# Patient Record
Sex: MALE | Race: WHITE | NOT HISPANIC OR LATINO | Employment: FULL TIME | URBAN - METROPOLITAN AREA
[De-identification: names, ages, dates, MRNs, and addresses within clinical notes are randomized per-mention and may not be internally consistent; named-entity substitution may affect disease eponyms.]

---

## 2017-09-27 ENCOUNTER — HOSPITAL ENCOUNTER (EMERGENCY)
Facility: MEDICAL CENTER | Age: 27
End: 2017-09-27
Attending: EMERGENCY MEDICINE
Payer: COMMERCIAL

## 2017-09-27 ENCOUNTER — HOSPITAL ENCOUNTER (OUTPATIENT)
Dept: RADIOLOGY | Facility: MEDICAL CENTER | Age: 27
End: 2017-09-27

## 2017-09-27 VITALS
WEIGHT: 140 LBS | OXYGEN SATURATION: 98 % | HEART RATE: 69 BPM | RESPIRATION RATE: 16 BRPM | DIASTOLIC BLOOD PRESSURE: 61 MMHG | HEIGHT: 70 IN | SYSTOLIC BLOOD PRESSURE: 130 MMHG | BODY MASS INDEX: 20.04 KG/M2 | TEMPERATURE: 99.9 F

## 2017-09-27 DIAGNOSIS — S68.623A PARTIAL TRAUMATIC TRANSPHALANGEAL AMPUTATION OF LEFT MIDDLE FINGER, INITIAL ENCOUNTER: ICD-10-CM

## 2017-09-27 DIAGNOSIS — S68.119A: ICD-10-CM

## 2017-09-27 DIAGNOSIS — S68.627A: ICD-10-CM

## 2017-09-27 PROCEDURE — 99285 EMERGENCY DEPT VISIT HI MDM: CPT

## 2017-09-27 PROCEDURE — 96374 THER/PROPH/DIAG INJ IV PUSH: CPT

## 2017-09-27 PROCEDURE — 700111 HCHG RX REV CODE 636 W/ 250 OVERRIDE (IP): Performed by: EMERGENCY MEDICINE

## 2017-09-27 RX ADMIN — HYDROMORPHONE HYDROCHLORIDE 1 MG: 1 INJECTION, SOLUTION INTRAMUSCULAR; INTRAVENOUS; SUBCUTANEOUS at 14:34

## 2017-09-27 NOTE — LETTER
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    Medical Information   Related to Industrial Injury? Yes ***   Subjective Complaints:  Hand injury, finger amputation   Objective Findings: Hand injury, finger amputation   Pre-Existing Condition(s):     Assessment:   Condition Same    Status: Additional Care RequiredDischarged / Care Transfer  Permanent Disability:Yes    Plan: ConsultationTransfer Care    Diagnostics: X-ray    Comments:  Transfer to Center that does finger reimplantations    Disability Information   Status: Temporarily Totally Disabled    From:     Through:   Restrictions are:     Physical Restrictions   Sitting:    Standing:    Stooping:    Bending:      Squatting:    Walking:    Climbing:    Pushing:      Pulling:    Other:    Reaching Above Shoulder (L):   Reaching Above Shoulder (R):       Reaching Below Shoulder (L):    Reaching Below Shoulder (R):      Not to exceed Weight Limits   Carrying(hrs):   Weight Limit(lb):   Lifting(hrs):   Weight  Limit(lb):     Comments: Patient was transferred to [place]    Repetitive Actions   Hands: i.e. Fine Manipulations from Grasping:     Feet: i.e. Operating Foot Controls:     Driving / Operate Machinery:     Physician Name: Neo Oliveira Physician  Signature: [email or web address]-[email or web address] Wilder M.D. e-Signature:  , Medical Director   Clinic Name / Location: [facility], EMERGENCY DEPT  [address] [place] [id number]  [phone]     Clinic Phone Number: Dept: [phone]   Appointment Time:  Visit Start Time:    Check-In Time:  2:08 PM Visit Discharge Time: 5:43 PM   Original-Treating Physician or Chiropractor    Page 2-Insurer/TPA    Page 3-Employer    Page 4-Employee

## 2017-09-27 NOTE — LETTER
[facility], EMERGENCY DEPT   [address] [place], [place] [address]  Phone: Dept: [phone] - Fax:        [facility] Progress Report and Disability Certification  Date of Service: 9/27/2017   No Show:  No  Date / Time of Next Visit:     Claim Information   Patient Name: Eriberto Mckeon  Claim Number:     Employer:    Date of Injury:      Insurer / TPA: [employer] ID / SSN: [id number]    Occupation:  Diagnosis: There were no encounter diagnoses.    Medical Information   Related to Industrial Injury?   ***   Subjective Complaints:      Objective Findings:     Pre-Existing Condition(s):     Assessment:        Status:    Permanent Disability:     Plan:      Diagnostics:      Comments:       Disability Information   Status:      From:     Through:   Restrictions are:     Physical Restrictions   Sitting:    Standing:    Stooping:    Bending:      Squatting:    Walking:    Climbing:    Pushing:      Pulling:    Other:    Reaching Above Shoulder (L):   Reaching Above Shoulder (R):       Reaching Below Shoulder (L):    Reaching Below Shoulder (R):      Not to exceed Weight Limits   Carrying(hrs):   Weight Limit(lb):   Lifting(hrs):   Weight  Limit(lb):     Comments:      Repetitive Actions   Hands: i.e. Fine Manipulations from Grasping:     Feet: i.e. Operating Foot Controls:     Driving / Operate Machinery:     Physician Name: Neo Oliveira Physician Signature:   e-Signature:  , Medical Director   Clinic Name / Location: [facility], EMERGENCY DEPT  [address] [place] [id number] [phone]     Clinic Phone Number: Dept: [phone]   Appointment Time:  Visit Start Time:    Check-In Time:  2:08 PM Visit Discharge Time:    Original-Treating Physician or Chiropractor    Page 2-Insurer/TPA    Page 3-Employer    Page 4-Employee

## 2017-09-27 NOTE — ED NOTES
Amputated finger removed from specimen cup - wrapped in moist gauze and placed in bag. Bag placed in ice.

## 2017-09-27 NOTE — ED PROVIDER NOTES
"ED Provider Note    Scribed for Neo Oliveira M.D. by Sil Harris. 9/27/2017  2:17 PM    Primary care provider: Pcp Pt States None  Means of arrival: EMS  History obtained from: Patient   History limited by: None    CHIEF COMPLAINT  Chief Complaint   Patient presents with   • Amputation   • Hand Injury       HPI  Eriberto Mckeon is a 27 y.o. male who presents to the Emergency Department by EMS from [facility] for amputation of his left index finger at 1150AM this morning. Patient was at work and using a table saw when he cut off his left finger. Patient states the pain to his left hand is beginning to \"build up again.\" He denies any nausea, vomiting, chest pain, abdominal pain or fever associated. He was an occasional smoker. The patient denies any past pertinent medical history, use of daily medications or allergies to medications.    REVIEW OF SYSTEMS  Pertinent positives include left index finger amputation, pain to left hand. Pertinent negatives include no nausea, vomiting, chest pain, shortness of breath, abdominal pain or fever.  All other systems reviewed and negative.  C.    PAST MEDICAL HISTORY    denies any past pertinent medical history    SURGICAL HISTORY   has a past surgical history that includes appendectomy.    SOCIAL HISTORY  Social History   Substance Use Topics   • Smoking status: Never Smoker   • Smokeless tobacco: Never Used   • Alcohol use Yes      Comment: 2-3 per day      History   Drug Use   • Types: Inhaled     Comment: THC       FAMILY HISTORY  History reviewed. No pertinent family history.    CURRENT MEDICATIONS  Home Medications     Reviewed by Theresa Shi R.N. (Registered Nurse) on 09/27/17 at 1416  Med List Status: Complete   Medication Last Dose Status        Patient Rocco Taking any Medications                       ALLERGIES  Allergies   Allergen Reactions   • Sulfa Drugs Hives       PHYSICAL EXAM  VITAL SIGNS: /92   Pulse 78   Temp 37 °C (98.6 " "°F)   Resp 20   Ht 1.778 m (5' 10\")   Wt 63.5 kg (140 lb)   BMI 20.09 kg/m²     Vital signs reviewed.  Constitutional: Well-appearing. No acute distress. Pleasant. Conversant  Head: Normocephalic, atraumatic  Eyes: PERRL, EOMI,   Mouth/Throat: Oropharynx is moist   Neck: Supple, no JVD  Cardiovascular: Regular rate and rhythm   Pulmonary/Chest: Lungs are clear to auscultation.   Abdominal: Soft, non-tender. normal bowel sounds  Musculoskeletal: Trans-phalangeal amputation the left 2nd digit. Near amputation of the 3rd and 4th left digits.  Lymphadenopathy: No lymphadenopathy at the elbow  Neurological: some sensation is intact of the 3rd and 4th digits  Skin: Warm, dry. No rash  Psychiatric: Appropriate for clinical presentation.    RADIOLOGY  OUTSIDE IMAGES-DX UPPER EXTREMITY, LEFT   Final Result      X-ray showed a transmetatarsal amputation of the left 2nd digit. There was extensive damage and open fractures to the left 3rd and 4th digits  .    COURSE & MEDICAL DECISION MAKING  Pertinent Labs & Imaging studies reviewed. (See chart for details) The patient's [facility] Nursing and past medical  records were reviewed    2:17 PM - Patient seen and examined at bedside. Patient will be treated with 1mg dilaudid. The differential diagnoses include but are not limited to:Amputation and near amputations    2:28 PM - I discussed the patient's case and the above findings with Dr. Riley ([facility]) who referred patient to [facility] for evaluation by the hand specialist on-call.  When he called the ER initially Dr. Sneed told him that we do not do reimplantations here. He was told expressly that if the patient desired to have a reimplantation done this with be done at the [facility] in [place]. They did not call this specialty clinic from [place]    2:46 PM Recheck: Patient re-evaluated at beside. Patient's radiology from outside clinic  Discussed. Patient wishes to have the [facility] contacted and determine if he would be a candidate for re-implantation.     2:49 PM Paged [facility] Hand specialist.     2:58 PM - I discussed the patient's case and the above findings with Dr. Nguyễn (Orthopedics) who agrees to manage the patient's wound but it would include an amputation of the 2nd digit. The patient does not want this at this point.     3:17 PM Recheck: Patient re-evaluated at [facility].  Discussed patient's condition and treatment plan.  The patient understood and is in agreement.     3:37 PM - I discussed the patient's case and the above findings with Dr. Benson ([facility] Hand specialist) who requests I send him DX-upper extremity left images.     3:39 PM Recheck: Patient re-evaluated at [facility]. Discussed my conversation with Dr. Benson with the patient. He understood and is in agreement. The patient would like to be transferred to [place]    3:48 PM - I discussed the patient's case and the above findings with Dr. Benson ([facility] Hand specialist) who agrees to accept patient for further treatment.    CRITICAL CARE  The very real possibility of a deterioration of this patient's condition required the highest level of my preparedness for sudden, emergent intervention.  I provided critical care services, which included medication orders, frequent reevaluations of the patient's condition and response to treatment, ordering and reviewing test results, and discussing the case with various consultants.  The critical care time associated with the care of the patient was 45 minutes. Review chart for interventions. This time is exclusive of any other billable procedures.     Patient will be transferred to [facility] in critical condition for further treatment under accepting physician, Dr. Otf Benson.     FINAL IMPRESSION  1. Amputation finger-complicated    2. Partial traumatic transphalangeal  amputation of left little finger, initial encounter    3. 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    Electronically signed by: Sil Harris (Scribe), 9/27/2017    INeo M.D. personally performed the services described in this documentation, as scribed by Sil Harris in my presence, and it is both accurate and complete.    The note accurately reflects work and decisions made by me.  Neo Oliveira  9/27/2017  4:04 PM

## 2017-09-27 NOTE — DISCHARGE PLANNING
Dr Oliveira requested to speak to a physician at the [facility].  placed call to Emeli ([phone]) who will page MD to call Dr Oliveira. Spoke with patient who agrees to be transferred if needed. Patient signed Release of Information form to release his medical records. Records from [facility] with facesheet faxed to ([phone]) and sent with confirmation.

## 2017-09-27 NOTE — LETTER
[facility], EMERGENCY DEPT   [address], [place] [address]  Phone: Dept: [phone] - Fax:        [facility] Progress Report and Disability Certification  Date of Service: 9/27/2017   No Show:  No  Date / Time of Next Visit:     Claim Information   Patient Name: Eriberto Mckeon  Claim Number:     Employer:    Date of Injury: 9/27/2017     Insurer / TPA: [employer] ID / SSN: [id number]    Occupation: [employer] Diagnosis: Diagnoses of Amputation finger-complicated, Partial traumatic transphalangeal amputation of left little finger, initial encounter, and Partial traumatic transphalangeal amputation of left middle finger, initial encounter were pertinent to this visit.    Medical Information   Related to Industrial Injury?   ***   Subjective Complaints:      Objective Findings:     Pre-Existing Condition(s):     Assessment:        Status:    Permanent Disability:     Plan:      Diagnostics:      Comments:       Disability Information   Status:      From:     Through:   Restrictions are:     Physical Restrictions   Sitting:    Standing:    Stooping:    Bending:      Squatting:    Walking:    Climbing:    Pushing:      Pulling:    Other:    Reaching Above Shoulder (L):   Reaching Above Shoulder (R):       Reaching Below Shoulder (L):    Reaching Below Shoulder (R):      Not to exceed Weight Limits   Carrying(hrs):   Weight Limit(lb):   Lifting(hrs):   Weight  Limit(lb):     Comments:      Repetitive Actions   Hands: i.e. Fine Manipulations from Grasping:     Feet: i.e. Operating Foot Controls:     Driving / Operate Machinery:     Physician Name: Neo Oliveira Physician Signature:   e-Signature:  , Medical Director   Clinic Name / Location: [facility], EMERGENCY DEPT  [address] [facility] [address]  [phone]     Clinic Phone Number: Dept: [phone]   Appointment Time:  Visit Start Time:    Check-In Time:   2:08 PM Visit Discharge Time:    Original-Treating Physician or Chiropractor    Page 2-Insurer/TPA    Page 3-Employer    Page 4-Employee

## 2017-09-27 NOTE — ED NOTES
Patient transferred from [place] for left hand finger amputation. Pointer finger Amputated just below the first knuckle, middle and ring digit also with partial amputation, injury to other fingers. Amputated part is with patient on ice. Bulky dressing in place.   Pt received 1 mg dilaudid, 10 mg of morphine, 4 zofran, 1 gram ancef at [facility].

## 2017-09-27 NOTE — DISCHARGE PLANNING
Arranged patient's transport to [facility] via [facility] at 1715.  Danica) notified.

## 2017-09-28 NOTE — ED NOTES
[facility]